# Patient Record
Sex: FEMALE | Race: WHITE | NOT HISPANIC OR LATINO
[De-identification: names, ages, dates, MRNs, and addresses within clinical notes are randomized per-mention and may not be internally consistent; named-entity substitution may affect disease eponyms.]

---

## 2017-06-01 PROBLEM — Z00.00 ENCOUNTER FOR PREVENTIVE HEALTH EXAMINATION: Status: ACTIVE | Noted: 2017-06-01

## 2021-01-14 ENCOUNTER — TRANSCRIPTION ENCOUNTER (OUTPATIENT)
Age: 81
End: 2021-01-14

## 2023-08-25 ENCOUNTER — INPATIENT (INPATIENT)
Facility: HOSPITAL | Age: 83
LOS: 2 days | Discharge: HOME HEALTH | DRG: 312 | End: 2023-08-28
Attending: INTERNAL MEDICINE | Admitting: INTERNAL MEDICINE
Payer: MEDICARE

## 2023-08-25 VITALS
OXYGEN SATURATION: 97 % | HEIGHT: 67 IN | SYSTOLIC BLOOD PRESSURE: 109 MMHG | RESPIRATION RATE: 17 BRPM | TEMPERATURE: 98 F | HEART RATE: 74 BPM | WEIGHT: 154.98 LBS | DIASTOLIC BLOOD PRESSURE: 73 MMHG

## 2023-08-25 DIAGNOSIS — R55 SYNCOPE AND COLLAPSE: ICD-10-CM

## 2023-08-25 LAB
ALBUMIN SERPL ELPH-MCNC: 3.8 G/DL — SIGNIFICANT CHANGE UP (ref 3.3–5)
ALBUMIN SERPL ELPH-MCNC: 3.8 G/DL — SIGNIFICANT CHANGE UP (ref 3.3–5)
ALP SERPL-CCNC: 82 U/L — SIGNIFICANT CHANGE UP (ref 40–120)
ALP SERPL-CCNC: 83 U/L — SIGNIFICANT CHANGE UP (ref 40–120)
ALT FLD-CCNC: 16 U/L — SIGNIFICANT CHANGE UP (ref 10–45)
ALT FLD-CCNC: 17 U/L — SIGNIFICANT CHANGE UP (ref 10–45)
ANION GAP SERPL CALC-SCNC: 10 MMOL/L — SIGNIFICANT CHANGE UP (ref 5–17)
ANION GAP SERPL CALC-SCNC: 12 MMOL/L — SIGNIFICANT CHANGE UP (ref 5–17)
APPEARANCE UR: ABNORMAL
APTT BLD: 22.2 SEC — LOW (ref 24.5–35.6)
AST SERPL-CCNC: 18 U/L — SIGNIFICANT CHANGE UP (ref 10–40)
AST SERPL-CCNC: 24 U/L — SIGNIFICANT CHANGE UP (ref 10–40)
BACTERIA # UR AUTO: ABNORMAL
BASOPHILS # BLD AUTO: 0.08 K/UL — SIGNIFICANT CHANGE UP (ref 0–0.2)
BASOPHILS NFR BLD AUTO: 1.1 % — SIGNIFICANT CHANGE UP (ref 0–2)
BILIRUB SERPL-MCNC: 0.5 MG/DL — SIGNIFICANT CHANGE UP (ref 0.2–1.2)
BILIRUB SERPL-MCNC: 0.5 MG/DL — SIGNIFICANT CHANGE UP (ref 0.2–1.2)
BILIRUB UR-MCNC: NEGATIVE — SIGNIFICANT CHANGE UP
BUN SERPL-MCNC: 24 MG/DL — HIGH (ref 7–23)
BUN SERPL-MCNC: 24 MG/DL — HIGH (ref 7–23)
CALCIUM SERPL-MCNC: 9.2 MG/DL — SIGNIFICANT CHANGE UP (ref 8.4–10.5)
CALCIUM SERPL-MCNC: 9.2 MG/DL — SIGNIFICANT CHANGE UP (ref 8.4–10.5)
CHLORIDE SERPL-SCNC: 103 MMOL/L — SIGNIFICANT CHANGE UP (ref 96–108)
CHLORIDE SERPL-SCNC: 103 MMOL/L — SIGNIFICANT CHANGE UP (ref 96–108)
CO2 SERPL-SCNC: 20 MMOL/L — LOW (ref 22–31)
CO2 SERPL-SCNC: 22 MMOL/L — SIGNIFICANT CHANGE UP (ref 22–31)
COLOR SPEC: ABNORMAL
CREAT SERPL-MCNC: 1.08 MG/DL — SIGNIFICANT CHANGE UP (ref 0.5–1.3)
CREAT SERPL-MCNC: 1.14 MG/DL — SIGNIFICANT CHANGE UP (ref 0.5–1.3)
DIFF PNL FLD: ABNORMAL
EGFR: 48 ML/MIN/1.73M2 — LOW
EGFR: 51 ML/MIN/1.73M2 — LOW
EOSINOPHIL # BLD AUTO: 0.09 K/UL — SIGNIFICANT CHANGE UP (ref 0–0.5)
EOSINOPHIL NFR BLD AUTO: 1.2 % — SIGNIFICANT CHANGE UP (ref 0–6)
EPI CELLS # UR: 12 /HPF — HIGH
GLUCOSE SERPL-MCNC: 111 MG/DL — HIGH (ref 70–99)
GLUCOSE SERPL-MCNC: 116 MG/DL — HIGH (ref 70–99)
GLUCOSE UR QL: NEGATIVE — SIGNIFICANT CHANGE UP
HCT VFR BLD CALC: 37 % — SIGNIFICANT CHANGE UP (ref 34.5–45)
HGB BLD-MCNC: 11.2 G/DL — LOW (ref 11.5–15.5)
HYALINE CASTS # UR AUTO: 5 /LPF — HIGH (ref 0–2)
IMM GRANULOCYTES NFR BLD AUTO: 0.4 % — SIGNIFICANT CHANGE UP (ref 0–0.9)
INR BLD: 0.99 RATIO — SIGNIFICANT CHANGE UP (ref 0.85–1.18)
KETONES UR-MCNC: NEGATIVE — SIGNIFICANT CHANGE UP
LEUKOCYTE ESTERASE UR-ACNC: ABNORMAL
LYMPHOCYTES # BLD AUTO: 1.19 K/UL — SIGNIFICANT CHANGE UP (ref 1–3.3)
LYMPHOCYTES # BLD AUTO: 16.5 % — SIGNIFICANT CHANGE UP (ref 13–44)
MAGNESIUM SERPL-MCNC: 2.5 MG/DL — SIGNIFICANT CHANGE UP (ref 1.6–2.6)
MCHC RBC-ENTMCNC: 25 PG — LOW (ref 27–34)
MCHC RBC-ENTMCNC: 30.3 GM/DL — LOW (ref 32–36)
MCV RBC AUTO: 82.6 FL — SIGNIFICANT CHANGE UP (ref 80–100)
MONOCYTES # BLD AUTO: 0.51 K/UL — SIGNIFICANT CHANGE UP (ref 0–0.9)
MONOCYTES NFR BLD AUTO: 7.1 % — SIGNIFICANT CHANGE UP (ref 2–14)
NEUTROPHILS # BLD AUTO: 5.31 K/UL — SIGNIFICANT CHANGE UP (ref 1.8–7.4)
NEUTROPHILS NFR BLD AUTO: 73.7 % — SIGNIFICANT CHANGE UP (ref 43–77)
NITRITE UR-MCNC: POSITIVE
NRBC # BLD: 0 /100 WBCS — SIGNIFICANT CHANGE UP (ref 0–0)
PH UR: 6 — SIGNIFICANT CHANGE UP (ref 5–8)
PLATELET # BLD AUTO: 266 K/UL — SIGNIFICANT CHANGE UP (ref 150–400)
POTASSIUM SERPL-MCNC: 5.2 MMOL/L — SIGNIFICANT CHANGE UP (ref 3.5–5.3)
POTASSIUM SERPL-MCNC: 5.5 MMOL/L — HIGH (ref 3.5–5.3)
POTASSIUM SERPL-SCNC: 5.2 MMOL/L — SIGNIFICANT CHANGE UP (ref 3.5–5.3)
POTASSIUM SERPL-SCNC: 5.5 MMOL/L — HIGH (ref 3.5–5.3)
PROT SERPL-MCNC: 6.9 G/DL — SIGNIFICANT CHANGE UP (ref 6–8.3)
PROT SERPL-MCNC: 7.2 G/DL — SIGNIFICANT CHANGE UP (ref 6–8.3)
PROT UR-MCNC: ABNORMAL
PROTHROM AB SERPL-ACNC: 10.4 SEC — SIGNIFICANT CHANGE UP (ref 9.5–13)
RBC # BLD: 4.48 M/UL — SIGNIFICANT CHANGE UP (ref 3.8–5.2)
RBC # FLD: 14.6 % — HIGH (ref 10.3–14.5)
RBC CASTS # UR COMP ASSIST: 3 /HPF — SIGNIFICANT CHANGE UP (ref 0–4)
SODIUM SERPL-SCNC: 135 MMOL/L — SIGNIFICANT CHANGE UP (ref 135–145)
SODIUM SERPL-SCNC: 135 MMOL/L — SIGNIFICANT CHANGE UP (ref 135–145)
SP GR SPEC: 1.02 — SIGNIFICANT CHANGE UP (ref 1.01–1.02)
TROPONIN T, HIGH SENSITIVITY RESULT: 10 NG/L — SIGNIFICANT CHANGE UP (ref 0–51)
TROPONIN T, HIGH SENSITIVITY RESULT: 11 NG/L — SIGNIFICANT CHANGE UP (ref 0–51)
UROBILINOGEN FLD QL: NEGATIVE — SIGNIFICANT CHANGE UP
WBC # BLD: 7.21 K/UL — SIGNIFICANT CHANGE UP (ref 3.8–10.5)
WBC # FLD AUTO: 7.21 K/UL — SIGNIFICANT CHANGE UP (ref 3.8–10.5)
WBC UR QL: 96 /HPF — HIGH (ref 0–5)

## 2023-08-25 PROCEDURE — 71045 X-RAY EXAM CHEST 1 VIEW: CPT | Mod: 26

## 2023-08-25 PROCEDURE — 99285 EMERGENCY DEPT VISIT HI MDM: CPT | Mod: GC

## 2023-08-25 PROCEDURE — 70450 CT HEAD/BRAIN W/O DYE: CPT | Mod: 26,MA

## 2023-08-25 RX ORDER — SENNA PLUS 8.6 MG/1
1 TABLET ORAL
Refills: 0 | DISCHARGE

## 2023-08-25 RX ORDER — PANTOPRAZOLE SODIUM 20 MG/1
40 TABLET, DELAYED RELEASE ORAL
Refills: 0 | Status: DISCONTINUED | OUTPATIENT
Start: 2023-08-25 | End: 2023-08-28

## 2023-08-25 RX ORDER — LEVOTHYROXINE SODIUM 125 MCG
1 TABLET ORAL
Refills: 0 | DISCHARGE

## 2023-08-25 RX ORDER — LISINOPRIL 2.5 MG/1
5 TABLET ORAL DAILY
Refills: 0 | Status: DISCONTINUED | OUTPATIENT
Start: 2023-08-25 | End: 2023-08-27

## 2023-08-25 RX ORDER — FOLIC ACID 0.8 MG
1 TABLET ORAL DAILY
Refills: 0 | Status: DISCONTINUED | OUTPATIENT
Start: 2023-08-25 | End: 2023-08-28

## 2023-08-25 RX ORDER — SENNA PLUS 8.6 MG/1
2 TABLET ORAL AT BEDTIME
Refills: 0 | Status: DISCONTINUED | OUTPATIENT
Start: 2023-08-25 | End: 2023-08-28

## 2023-08-25 RX ORDER — POLYETHYLENE GLYCOL 3350 17 G/17G
17 POWDER, FOR SOLUTION ORAL
Refills: 0 | DISCHARGE

## 2023-08-25 RX ORDER — ATORVASTATIN CALCIUM 80 MG/1
20 TABLET, FILM COATED ORAL AT BEDTIME
Refills: 0 | Status: DISCONTINUED | OUTPATIENT
Start: 2023-08-25 | End: 2023-08-28

## 2023-08-25 RX ORDER — LEVOTHYROXINE SODIUM 125 MCG
100 TABLET ORAL DAILY
Refills: 0 | Status: DISCONTINUED | OUTPATIENT
Start: 2023-08-25 | End: 2023-08-28

## 2023-08-25 RX ORDER — ROSUVASTATIN CALCIUM 5 MG/1
1 TABLET ORAL
Refills: 0 | DISCHARGE

## 2023-08-25 RX ORDER — POLYETHYLENE GLYCOL 3350 17 G/17G
17 POWDER, FOR SOLUTION ORAL DAILY
Refills: 0 | Status: DISCONTINUED | OUTPATIENT
Start: 2023-08-25 | End: 2023-08-28

## 2023-08-25 RX ORDER — CEFTRIAXONE 500 MG/1
1000 INJECTION, POWDER, FOR SOLUTION INTRAMUSCULAR; INTRAVENOUS ONCE
Refills: 0 | Status: COMPLETED | OUTPATIENT
Start: 2023-08-25 | End: 2023-08-25

## 2023-08-25 RX ORDER — FOLIC ACID 0.8 MG
1 TABLET ORAL
Refills: 0 | DISCHARGE

## 2023-08-25 RX ORDER — OMEPRAZOLE 10 MG/1
2 CAPSULE, DELAYED RELEASE ORAL
Refills: 0 | DISCHARGE

## 2023-08-25 RX ORDER — CEFTRIAXONE 500 MG/1
1000 INJECTION, POWDER, FOR SOLUTION INTRAMUSCULAR; INTRAVENOUS EVERY 24 HOURS
Refills: 0 | Status: COMPLETED | OUTPATIENT
Start: 2023-08-26 | End: 2023-08-27

## 2023-08-25 RX ORDER — GALANTAMINE HYDROBROMIDE 4 MG/1
1 TABLET, FILM COATED ORAL
Refills: 0 | DISCHARGE

## 2023-08-25 RX ADMIN — CEFTRIAXONE 100 MILLIGRAM(S): 500 INJECTION, POWDER, FOR SOLUTION INTRAMUSCULAR; INTRAVENOUS at 15:59

## 2023-08-25 RX ADMIN — ATORVASTATIN CALCIUM 20 MILLIGRAM(S): 80 TABLET, FILM COATED ORAL at 21:02

## 2023-08-25 RX ADMIN — SENNA PLUS 2 TABLET(S): 8.6 TABLET ORAL at 21:02

## 2023-08-25 RX ADMIN — LISINOPRIL 5 MILLIGRAM(S): 2.5 TABLET ORAL at 18:32

## 2023-08-25 NOTE — H&P ADULT - HISTORY OF PRESENT ILLNESS
83-year-old female with PMH mds, cad s/p stenting presenting after 3 episodes of syncope that occurred today..  Patient was brought in by EMS. Aide at bedside states that pt has had diarrhea for the last 24h. Was having an episode and lost consiousness on the toilet. Pt also endorsing dysuria. Deneis fever cp sob abd pain.

## 2023-08-25 NOTE — ED PROVIDER NOTE - ATTENDING CONTRIBUTION TO CARE
Attending MD Gruber:  I personally have seen and examined this patient. I have performed a substantive portion of the visit including all aspects of the medical decision making.  Resident note reviewed and agree on plan of care and except where noted.      83-year-old woman presenting from doctor's office with her aide for syncopal episode x3.  Patient reportedly was having a loose bowel movement and then lost consciousness.  Aide states that the patient was shaking all over and that this lasted a couple minutes.  Patient's aide states that this happens frequently in the past however the diagnosis is uncertain.  The patient herself states she must of fainted but she is not sure what happened prior to this.  She is asking to be discharged immediately.  She feels well at this time.  The patient is a limited and evasive historian otherwise.    Patient's vital signs nonactionable.  She is sitting in stretcher no apparent distress.  Tongue with no lacerations or abrasions.  Head is atraumatic.  Moves all extremities spontaneously.  Clear lungs with regular heart sounds no obvious murmur.  Abdomen nontender.  Peripheral pulses intact.    ECG at 1142 independently interpreted by me and shows normal sinus rhythm left axis deviation no diagnostic ischemic findings normal intervals.    Patient presenting for evaluation of syncopal episode today.  There was some shaking activity so would consider seizure activity as well however less likely as this is happened to the patient frequently and there does not appear to be any confusional episodes thereafter.  Plan for EKG lab work CT head maintain on telemetry and reassess          *The above represents an initial assessment/impression. Please refer to progress notes for potential changes in patient clinical course*

## 2023-08-25 NOTE — ED PROVIDER NOTE - IV ALTEPLASE INCLUSION HIDDEN
Results for orders placed or performed in visit on 01/13/20   Cardiac EP device report    Narrative    MDT DUAL CHAMBER ICD  CARELINK TRANSMISSION: BATTERY STATUS "OK"  AP 2%  0%  ALL AVAILABLE LEAD PARAMETERS WITHIN NORMAL LIMITS  NO SIGNIFICANT HIGH RATE EPISODES  NORMAL DEVICE FUNCTION   NC
show

## 2023-08-25 NOTE — ED PROVIDER NOTE - PHYSICAL EXAMINATION
PHYSICAL EXAM:  GEN: NAD awake alert   HEAD: Atraumatic  EYES: Clear bilaterally, pupils equal, round and reactive to light.  CARDIAC: Normal rate, regular rhythm. +S1/S2. No murmurs, rubs or gallops.  RESPIRATORY: Breathing unlabored. Breath sounds clear and equal bilaterally.  ABDOMEN:  Soft, nontender, nondistended.   NEUROLOGICAL: Alert and oriented, no focal deficits, no motor or sensory deficits. CN2-12 intact. Sensation intact x4 extremities.  SKIN: Skin warm and dry. No evidence of rashes or lesions.

## 2023-08-25 NOTE — PATIENT PROFILE ADULT - FALL HARM RISK - HARM RISK INTERVENTIONS
Assistance with ambulation/Assistance OOB with selected safe patient handling equipment/Communicate Risk of Fall with Harm to all staff/Monitor for mental status changes/Monitor gait and stability/Reinforce activity limits and safety measures with patient and family/Reorient to person, place and time as needed/Review medications for side effects contributing to fall risk/Sit up slowly, dangle for a short time, stand at bedside before walking/Tailored Fall Risk Interventions/Toileting schedule using arm’s reach rule for commode and bathroom/Use of alarms - bed, chair and/or voice tab/Visual Cue: Yellow wristband and red socks/Bed in lowest position, wheels locked, appropriate side rails in place/Call bell, personal items and telephone in reach/Instruct patient to call for assistance before getting out of bed or chair/Non-slip footwear when patient is out of bed/[place] to call system/Physically safe environment - no spills, clutter or unnecessary equipment/Purposeful Proactive Rounding/Room/bathroom lighting operational, light cord in reach

## 2023-08-25 NOTE — ED PROVIDER NOTE - CLINICAL SUMMARY MEDICAL DECISION MAKING FREE TEXT BOX
83F with pmh mds and cad s/p stenting presenting with syncope, x 3today. atraumatic. VS not actionable. Exam with normal neurologic exam, normal heart/lung sounds, nontender abdomen. Plan to obtain ekg cxr cth cbc cmp trop , reassess

## 2023-08-25 NOTE — ED PROVIDER NOTE - OBJECTIVE STATEMENT
83-year-old female presenting after 3 episodes of syncope that occurred today.  Patient was brought in by EMS 83-year-old female presenting after 3 episodes of syncope that occurred today. PMH mds, cad s/p stenting.  Patient was brought in by EMS. Aide at bedside states that pt has had diarrhea for the last 24h. Was having an episode and lost consiousness on the toilet. Pt also endorsing dysuria. Deneis fever cp sob abd pain.

## 2023-08-25 NOTE — ED ADULT NURSE NOTE - OBJECTIVE STATEMENT
84 y/o female BIBA s/p syncope. A&Ox2 (person and place). PMH dementia, HLD, anemia with hx of Blood transfusions and monthly iron infusions via a right chest port. Patient presenting with her home aid that lives with her 24-7. Patient was at her urologist due to several days of frequent urination. At the urologist her aid was wheeling her to the bathroom when she had a witnessed syncopal episode in the 82 y/o female BIBA s/p syncope. A&Ox2 (person and place). PMH dementia, HLD, anemia with hx of Blood transfusions and monthly iron infusions via a right chest port. Patient presenting with her home aid that lives with her 24-7. Patient was at her urologist due to several days of frequent urination. At the urologist her aid was wheeling her to the bathroom when she had a witnessed syncopal episode in the wheelchair, no loss of bowl or bladder during this episode no seizure like activity. Patient endorses "feeling weird" prior to the syncopal episode.

## 2023-08-26 DIAGNOSIS — F03.A0 UNSPECIFIED DEMENTIA, MILD, WITHOUT BEHAVIORAL DISTURBANCE, PSYCHOTIC DISTURBANCE, MOOD DISTURBANCE, AND ANXIETY: ICD-10-CM

## 2023-08-26 DIAGNOSIS — D46.9 MYELODYSPLASTIC SYNDROME, UNSPECIFIED: ICD-10-CM

## 2023-08-26 DIAGNOSIS — N39.0 URINARY TRACT INFECTION, SITE NOT SPECIFIED: ICD-10-CM

## 2023-08-26 DIAGNOSIS — E03.9 HYPOTHYROIDISM, UNSPECIFIED: ICD-10-CM

## 2023-08-26 DIAGNOSIS — I10 ESSENTIAL (PRIMARY) HYPERTENSION: ICD-10-CM

## 2023-08-26 DIAGNOSIS — R55 SYNCOPE AND COLLAPSE: ICD-10-CM

## 2023-08-26 DIAGNOSIS — I25.10 ATHEROSCLEROTIC HEART DISEASE OF NATIVE CORONARY ARTERY WITHOUT ANGINA PECTORIS: ICD-10-CM

## 2023-08-26 LAB
ANION GAP SERPL CALC-SCNC: 12 MMOL/L — SIGNIFICANT CHANGE UP (ref 5–17)
BUN SERPL-MCNC: 20 MG/DL — SIGNIFICANT CHANGE UP (ref 7–23)
CALCIUM SERPL-MCNC: 9.2 MG/DL — SIGNIFICANT CHANGE UP (ref 8.4–10.5)
CHLORIDE SERPL-SCNC: 103 MMOL/L — SIGNIFICANT CHANGE UP (ref 96–108)
CO2 SERPL-SCNC: 22 MMOL/L — SIGNIFICANT CHANGE UP (ref 22–31)
CREAT SERPL-MCNC: 1.04 MG/DL — SIGNIFICANT CHANGE UP (ref 0.5–1.3)
EGFR: 53 ML/MIN/1.73M2 — LOW
GLUCOSE SERPL-MCNC: 104 MG/DL — HIGH (ref 70–99)
HCT VFR BLD CALC: 34.9 % — SIGNIFICANT CHANGE UP (ref 34.5–45)
HGB BLD-MCNC: 10.7 G/DL — LOW (ref 11.5–15.5)
MAGNESIUM SERPL-MCNC: 2.5 MG/DL — SIGNIFICANT CHANGE UP (ref 1.6–2.6)
MCHC RBC-ENTMCNC: 25 PG — LOW (ref 27–34)
MCHC RBC-ENTMCNC: 30.7 GM/DL — LOW (ref 32–36)
MCV RBC AUTO: 81.5 FL — SIGNIFICANT CHANGE UP (ref 80–100)
NRBC # BLD: 0 /100 WBCS — SIGNIFICANT CHANGE UP (ref 0–0)
PHOSPHATE SERPL-MCNC: 3.9 MG/DL — SIGNIFICANT CHANGE UP (ref 2.5–4.5)
PLATELET # BLD AUTO: 270 K/UL — SIGNIFICANT CHANGE UP (ref 150–400)
POTASSIUM SERPL-MCNC: 4.8 MMOL/L — SIGNIFICANT CHANGE UP (ref 3.5–5.3)
POTASSIUM SERPL-SCNC: 4.8 MMOL/L — SIGNIFICANT CHANGE UP (ref 3.5–5.3)
RBC # BLD: 4.28 M/UL — SIGNIFICANT CHANGE UP (ref 3.8–5.2)
RBC # FLD: 14.5 % — SIGNIFICANT CHANGE UP (ref 10.3–14.5)
SODIUM SERPL-SCNC: 137 MMOL/L — SIGNIFICANT CHANGE UP (ref 135–145)
WBC # BLD: 4.71 K/UL — SIGNIFICANT CHANGE UP (ref 3.8–10.5)
WBC # FLD AUTO: 4.71 K/UL — SIGNIFICANT CHANGE UP (ref 3.8–10.5)

## 2023-08-26 RX ORDER — LANOLIN ALCOHOL/MO/W.PET/CERES
5 CREAM (GRAM) TOPICAL ONCE
Refills: 0 | Status: COMPLETED | OUTPATIENT
Start: 2023-08-26 | End: 2023-08-26

## 2023-08-26 RX ORDER — ENOXAPARIN SODIUM 100 MG/ML
40 INJECTION SUBCUTANEOUS EVERY 24 HOURS
Refills: 0 | Status: DISCONTINUED | OUTPATIENT
Start: 2023-08-26 | End: 2023-08-28

## 2023-08-26 RX ORDER — ASPIRIN/CALCIUM CARB/MAGNESIUM 324 MG
81 TABLET ORAL DAILY
Refills: 0 | Status: DISCONTINUED | OUTPATIENT
Start: 2023-08-26 | End: 2023-08-28

## 2023-08-26 RX ORDER — LANOLIN ALCOHOL/MO/W.PET/CERES
3 CREAM (GRAM) TOPICAL ONCE
Refills: 0 | Status: COMPLETED | OUTPATIENT
Start: 2023-08-26 | End: 2023-08-26

## 2023-08-26 RX ADMIN — Medication 5 MILLIGRAM(S): at 00:11

## 2023-08-26 RX ADMIN — ENOXAPARIN SODIUM 40 MILLIGRAM(S): 100 INJECTION SUBCUTANEOUS at 00:36

## 2023-08-26 RX ADMIN — ATORVASTATIN CALCIUM 20 MILLIGRAM(S): 80 TABLET, FILM COATED ORAL at 22:15

## 2023-08-26 RX ADMIN — LISINOPRIL 5 MILLIGRAM(S): 2.5 TABLET ORAL at 17:22

## 2023-08-26 RX ADMIN — Medication 3 MILLIGRAM(S): at 23:28

## 2023-08-26 RX ADMIN — SENNA PLUS 2 TABLET(S): 8.6 TABLET ORAL at 22:15

## 2023-08-26 RX ADMIN — POLYETHYLENE GLYCOL 3350 17 GRAM(S): 17 POWDER, FOR SOLUTION ORAL at 12:31

## 2023-08-26 RX ADMIN — Medication 100 MICROGRAM(S): at 07:56

## 2023-08-26 RX ADMIN — Medication 81 MILLIGRAM(S): at 22:15

## 2023-08-26 RX ADMIN — ENOXAPARIN SODIUM 40 MILLIGRAM(S): 100 INJECTION SUBCUTANEOUS at 23:27

## 2023-08-26 RX ADMIN — PANTOPRAZOLE SODIUM 40 MILLIGRAM(S): 20 TABLET, DELAYED RELEASE ORAL at 07:57

## 2023-08-26 RX ADMIN — Medication 1 MILLIGRAM(S): at 12:31

## 2023-08-26 RX ADMIN — CEFTRIAXONE 100 MILLIGRAM(S): 500 INJECTION, POWDER, FOR SOLUTION INTRAMUSCULAR; INTRAVENOUS at 15:39

## 2023-08-26 NOTE — PHYSICAL THERAPY INITIAL EVALUATION ADULT - ACTIVE RANGE OF MOTION EXAMINATION, REHAB EVAL
+ kyphosis, forward head psoture/bilateral upper extremity Active ROM was WFL (within functional limits)/bilateral  lower extremity Active ROM was WFL (within functional limits)

## 2023-08-26 NOTE — PHYSICAL THERAPY INITIAL EVALUATION ADULT - IMPAIRED TRANSFERS: SIT/STAND, REHAB EVAL
impaired balance/decreased flexibility/impaired postural control/impaired sensory feedback/decreased strength

## 2023-08-26 NOTE — PROGRESS NOTE ADULT - SUBJECTIVE AND OBJECTIVE BOX
Date of Service  : 08-26-23     INTERVAL HPI/OVERNIGHT EVENTS: Seen and examined with HHA in room. Feeling better.   Vital Signs Last 24 Hrs  T(C): 36.5 (26 Aug 2023 12:24), Max: 37.1 (25 Aug 2023 20:52)  T(F): 97.7 (26 Aug 2023 12:24), Max: 98.7 (25 Aug 2023 20:52)  HR: 66 (26 Aug 2023 12:24) (66 - 76)  BP: 95/61 (26 Aug 2023 12:24) (95/61 - 183/85)  BP(mean): 96 (25 Aug 2023 18:59) (94 - 96)  RR: 18 (26 Aug 2023 12:24) (18 - 18)  SpO2: 96% (26 Aug 2023 12:24) (96% - 100%)    Parameters below as of 26 Aug 2023 12:24  Patient On (Oxygen Delivery Method): room air      I&O's Summary    MEDICATIONS  (STANDING):  atorvastatin 20 milliGRAM(s) Oral at bedtime  cefTRIAXone   IVPB 1000 milliGRAM(s) IV Intermittent every 24 hours  enoxaparin Injectable 40 milliGRAM(s) SubCutaneous every 24 hours  folic acid 1 milliGRAM(s) Oral daily  levothyroxine 100 MICROGram(s) Oral daily  lisinopril 5 milliGRAM(s) Oral daily  pantoprazole    Tablet 40 milliGRAM(s) Oral before breakfast  polyethylene glycol 3350 17 Gram(s) Oral daily  senna 2 Tablet(s) Oral at bedtime    MEDICATIONS  (PRN):    LABS:                        10.7   4.71  )-----------( 270      ( 26 Aug 2023 10:41 )             34.9     08-26    137  |  103  |  20  ----------------------------<  104<H>  4.8   |  22  |  1.04    Ca    9.2      26 Aug 2023 10:41  Phos  3.9     08-26  Mg     2.5     08-26    TPro  6.9  /  Alb  3.8  /  TBili  0.5  /  DBili  x   /  AST  18  /  ALT  16  /  AlkPhos  82  08-25    PT/INR - ( 25 Aug 2023 11:54 )   PT: 10.4 sec;   INR: 0.99 ratio         PTT - ( 25 Aug 2023 11:54 )  PTT:22.2 sec  Urinalysis Basic - ( 26 Aug 2023 10:41 )    Color: x / Appearance: x / SG: x / pH: x  Gluc: 104 mg/dL / Ketone: x  / Bili: x / Urobili: x   Blood: x / Protein: x / Nitrite: x   Leuk Esterase: x / RBC: x / WBC x   Sq Epi: x / Non Sq Epi: x / Bacteria: x      CAPILLARY BLOOD GLUCOSE            Urinalysis Basic - ( 26 Aug 2023 10:41 )    Color: x / Appearance: x / SG: x / pH: x  Gluc: 104 mg/dL / Ketone: x  / Bili: x / Urobili: x   Blood: x / Protein: x / Nitrite: x   Leuk Esterase: x / RBC: x / WBC x   Sq Epi: x / Non Sq Epi: x / Bacteria: x      REVIEW OF SYSTEMS:  CONSTITUTIONAL: No fever, weight loss, or fatigue  EYES: No eye pain, visual disturbances, or discharge  ENMT:  No difficulty hearing, tinnitus, vertigo; No sinus or throat pain  NECK: No pain or stiffness  RESPIRATORY: No cough, wheezing, chills or hemoptysis; No shortness of breath  CARDIOVASCULAR: No chest pain, palpitations, dizziness, or leg swelling  GASTROINTESTINAL: No abdominal or epigastric pain. No nausea, vomiting, or hematemesis; No diarrhea or constipation. No melena or hematochezia.  GENITOURINARY: No dysuria, frequency, hematuria, or incontinence      RADIOLOGY & ADDITIONAL TESTS:    Consultant(s) Notes Reviewed:  [x ] YES  [ ] NO    PHYSICAL EXAM:  GENERAL: NAD, well-groomed, well-developed,not in any distress ,  HEAD:  Atraumatic, Normocephalic  EYES: EOMI, PERRLA, conjunctiva and sclera clear  ENMT: No tonsillar erythema, exudates, or enlargement; Moist mucous membranes, Good dentition, No lesions  NECK: Supple, No JVD, Normal thyroid  NERVOUS SYSTEM:  Alert &, No focal deficit   CHEST/LUNG: Good air entry bilateral with no  rales, rhonchi, wheezing, or rubs  HEART: Regular rate and rhythm; No murmurs, rubs, or gallops  ABDOMEN: Soft, Nontender, Nondistended; Bowel sounds present  EXTREMITIES:  2+ Peripheral Pulses, No clubbing, cyanosis, or edema  SKIN: No rashes or lesions    Care Discussed with Consultants/Other Providers [ x] YES  [ ] NO

## 2023-08-26 NOTE — PHYSICAL THERAPY INITIAL EVALUATION ADULT - BALANCE TRAINING, PT EVAL
Goal: Pt will improve balance one half grade to improve performance and safety of transfers and ambulation in 3 weeks.

## 2023-08-26 NOTE — PHYSICAL THERAPY INITIAL EVALUATION ADULT - ADDITIONAL COMMENTS
Pt lives in  a private house with + ramp and pt resides on main floor. Pt has 24 hr x 7 day HHA. Pt has hospital bed, wheelchair and RW.

## 2023-08-26 NOTE — CONSULT NOTE ADULT - SUBJECTIVE AND OBJECTIVE BOX
Cardiovascular Disease Initial Evaluation  Date of service: 08-26-23 @ 08:28    CHIEF COMPLAINT:  Syncope    HISTORY OF PRESENT ILLNESS:  Ms. Riggins is an  83-year-old female with PMH mds, cad s/p PCI, HTN,  presenting after 3 episodes of syncope.  Patient was brought in by EMS. Aide at bedside states that pt has had diarrhea for the last 24h. Was having an episode and lost consiousness on the toilet. Pt also endorsing dysuria. Deneis fever cp sob abd pain.        Allergies    No Known Allergies    Intolerances    	    MEDICATIONS:  enoxaparin Injectable 40 milliGRAM(s) SubCutaneous every 24 hours  lisinopril 5 milliGRAM(s) Oral daily    cefTRIAXone   IVPB 1000 milliGRAM(s) IV Intermittent every 24 hours        pantoprazole    Tablet 40 milliGRAM(s) Oral before breakfast  polyethylene glycol 3350 17 Gram(s) Oral daily  senna 2 Tablet(s) Oral at bedtime    atorvastatin 20 milliGRAM(s) Oral at bedtime  levothyroxine 100 MICROGram(s) Oral daily    folic acid 1 milliGRAM(s) Oral daily      PAST MEDICAL & SURGICAL HISTORY:  MDS (myelodysplastic syndrome)      CAD (coronary artery disease)          FAMILY HISTORY:      SOCIAL HISTORY:    The patient is a nonsmoker       REVIEW OF SYSTEMS:  See HPI, otherwise complete 14 point review of systems negative    [x ] All others negative	  [ ] Unable to obtain    PHYSICAL EXAM:  T(C): 37.1 (08-26-23 @ 04:26), Max: 37.1 (08-25-23 @ 20:52)  HR: 66 (08-26-23 @ 04:26) (66 - 76)  BP: 105/62 (08-26-23 @ 04:26) (105/62 - 183/85)  RR: 18 (08-26-23 @ 04:26) (17 - 18)  SpO2: 96% (08-26-23 @ 04:26) (96% - 100%)  Wt(kg): --  I&O's Summary      Appearance: No Acute Distress; resting comfortably  HEENT:  Normal oral mucosa, PERRL, EOMI	  Cardiovascular: Normal S1 S2, No JVD, No murmurs/rubs/gallops  Respiratory: Normal respiratory effort; Lungs clear to auscultation bilaterally  Gastrointestinal:  Soft, Non-tender, + BS	  Skin: No rashes, No ecchymoses, No cyanosis	  Neurologic: Non-focal; no weakness  Extremities: No clubbing, cyanosis or edema  Vascular: Peripheral pulses palpable 2+ bilaterally  Psychiatry: A & O x 3, Mood & affect appropriate    Laboratory Data:	 	    CBC Full  -  ( 25 Aug 2023 11:54 )  WBC Count : 7.21 K/uL  Hemoglobin : 11.2 g/dL  Hematocrit : 37.0 %  Platelet Count - Automated : 266 K/uL  Mean Cell Volume : 82.6 fl  Mean Cell Hemoglobin : 25.0 pg  Mean Cell Hemoglobin Concentration : 30.3 gm/dL  Auto Neutrophil # : 5.31 K/uL  Auto Lymphocyte # : 1.19 K/uL  Auto Monocyte # : 0.51 K/uL  Auto Eosinophil # : 0.09 K/uL  Auto Basophil # : 0.08 K/uL  Auto Neutrophil % : 73.7 %  Auto Lymphocyte % : 16.5 %  Auto Monocyte % : 7.1 %  Auto Eosinophil % : 1.2 %  Auto Basophil % : 1.1 %    08-25    135  |  103  |  24<H>  ----------------------------<  111<H>  5.2   |  22  |  1.14  08-25    135  |  103  |  24<H>  ----------------------------<  116<H>  5.5<H>   |  20<L>  |  1.08    Ca    9.2      25 Aug 2023 13:32  Ca    9.2      25 Aug 2023 11:54  Mg     2.5     08-25    TPro  6.9  /  Alb  3.8  /  TBili  0.5  /  DBili  x   /  AST  18  /  ALT  16  /  AlkPhos  82  08-25  TPro  7.2  /  Alb  3.8  /  TBili  0.5  /  DBili  x   /  AST  24  /  ALT  17  /  AlkPhos  83  08-25      proBNP:   Lipid Profile:   HgA1c:   TSH:       CARDIAC MARKERS: Trop T 11-10            Interpretation of Telemetry: 	    ECG:  	  RADIOLOGY:  OTHER: 	    PREVIOUS DIAGNOSTIC TESTING:    [ ] Echocardiogram:  [ ] Catheterization:  [ ] Stress Test:  	    Assessment:  83-year-old female with PMH mds, cad s/p PCI, HTN,  presenting after 3 episodes of syncope    Plan of Care:    #Syncope  - Rule out cardiogenic etiology  - ACS ruled out  - EKG shows  - Echo pending  - Pt UA positive, it is possible this may be 2/2 acute infection  - Please check orthostatics    #      72 minutes spent on total encounter; more than 50% of the visit was spent counseling and/or coordinating care by the attending physician.   	  Jesus Alberto Silva DO [facility]  Cardiovascular Diseases  [phone]     Cardiovascular Disease Initial Evaluation  Date of service: 08-26-23 @ 08:28    CHIEF COMPLAINT:  Syncope    HISTORY OF PRESENT ILLNESS:  Ms. Riggins is an  83-year-old female with PMH mds, cad s/p PCI, HTN,  presenting after 3 episodes of syncope.  Patient was brought in by EMS. Aide at bedside states that pt has had diarrhea for the last 24h. Was having an episode and lost consiousness on the toilet. Pt also endorsing dysuria. Deneis fever cp sob abd pain.      Pt states that her stents were done at [place] 3-5 years ago.       Allergies    No Known Allergies    Intolerances    	    MEDICATIONS:  enoxaparin Injectable 40 milliGRAM(s) SubCutaneous every 24 hours  lisinopril 5 milliGRAM(s) Oral daily    cefTRIAXone   IVPB 1000 milliGRAM(s) IV Intermittent every 24 hours        pantoprazole    Tablet 40 milliGRAM(s) Oral before breakfast  polyethylene glycol 3350 17 Gram(s) Oral daily  senna 2 Tablet(s) Oral at bedtime    atorvastatin 20 milliGRAM(s) Oral at bedtime  levothyroxine 100 MICROGram(s) Oral daily    folic acid 1 milliGRAM(s) Oral daily      PAST MEDICAL & SURGICAL HISTORY:  MDS (myelodysplastic syndrome)      CAD (coronary artery disease)          FAMILY HISTORY:      SOCIAL HISTORY:    The patient is a nonsmoker       REVIEW OF SYSTEMS:  See HPI, otherwise complete 14 point review of systems negative    [x ] All others negative	  [ ] Unable to obtain    PHYSICAL EXAM:  T(C): 37.1 (08-26-23 @ 04:26), Max: 37.1 (08-25-23 @ 20:52)  HR: 66 (08-26-23 @ 04:26) (66 - 76)  BP: 105/62 (08-26-23 @ 04:26) (105/62 - 183/85)  RR: 18 (08-26-23 @ 04:26) (17 - 18)  SpO2: 96% (08-26-23 @ 04:26) (96% - 100%)  Wt(kg): --  I&O's Summary      Appearance: No Acute Distress; resting comfortably  HEENT:  Normal oral mucosa, PERRL, EOMI	  Cardiovascular: Normal S1 S2, No JVD, No murmurs/rubs/gallops  Respiratory: Normal respiratory effort; Lungs clear to auscultation bilaterally  Gastrointestinal:  Soft, Non-tender, + BS	  Skin: No rashes, No ecchymoses, No cyanosis	  Neurologic: Non-focal; no weakness  Extremities: No clubbing, cyanosis or edema  Vascular: Peripheral pulses palpable 2+ bilaterally  Psychiatry: A & O x 3, Mood & affect appropriate    Laboratory Data:	 	    CBC Full  -  ( 25 Aug 2023 11:54 )  WBC Count : 7.21 K/uL  Hemoglobin : 11.2 g/dL  Hematocrit : 37.0 %  Platelet Count - Automated : 266 K/uL  Mean Cell Volume : 82.6 fl  Mean Cell Hemoglobin : 25.0 pg  Mean Cell Hemoglobin Concentration : 30.3 gm/dL  Auto Neutrophil # : 5.31 K/uL  Auto Lymphocyte # : 1.19 K/uL  Auto Monocyte # : 0.51 K/uL  Auto Eosinophil # : 0.09 K/uL  Auto Basophil # : 0.08 K/uL  Auto Neutrophil % : 73.7 %  Auto Lymphocyte % : 16.5 %  Auto Monocyte % : 7.1 %  Auto Eosinophil % : 1.2 %  Auto Basophil % : 1.1 %    08-25    135  |  103  |  24<H>  ----------------------------<  111<H>  5.2   |  22  |  1.14  08-25    135  |  103  |  24<H>  ----------------------------<  116<H>  5.5<H>   |  20<L>  |  1.08    Ca    9.2      25 Aug 2023 13:32  Ca    9.2      25 Aug 2023 11:54  Mg     2.5     08-25    TPro  6.9  /  Alb  3.8  /  TBili  0.5  /  DBili  x   /  AST  18  /  ALT  16  /  AlkPhos  82  08-25  TPro  7.2  /  Alb  3.8  /  TBili  0.5  /  DBili  x   /  AST  24  /  ALT  17  /  AlkPhos  83  08-25      proBNP:   Lipid Profile:   HgA1c:   TSH:       CARDIAC MARKERS: Trop T 11-10            Interpretation of Telemetry: Sinus    ECG: Sinus rhythm  RADIOLOGY:  OTHER: 	    PREVIOUS DIAGNOSTIC TESTING:    [ ] Echocardiogram:  [ ] Catheterization:  [ ] Stress Test:  	    Assessment:  83-year-old female with PMH mds, cad s/p PCI, HTN,  presenting after 3 episodes of syncope    Plan of Care:    #Syncope  - Rule out cardiogenic etiology  - ACS ruled out  - EKG shows  - Echo pending  - Pt UA positive, it is possible this may be 2/2 acute infection  - Please check orthostatics    #UTI   - Management as per primary team    #CAD  - History of stents  - Recommend ASA 81mg daily      #HTN  - Continue Lisinopril    72 minutes spent on total encounter; more than 50% of the visit was spent counseling and/or coordinating care by the attending physician.   	  Jesus Alberto Silva,  [facility]  Cardiovascular Diseases  [phone]

## 2023-08-26 NOTE — PHYSICAL THERAPY INITIAL EVALUATION ADULT - PERTINENT HX OF CURRENT PROBLEM, REHAB EVAL
84 y/o F with PMH mds, cad s/p stenting presenting after 3 episodes of syncope that occurred today. Pt was brought in by EMS. Aide at bedside states that pt has had diarrhea for the last 24h. Was having an episode and LOC on the toilet. Pt also endorsing dysuria. Deneis fever cp sob abd pain. P found to have + UA. ECG: NSR with L axis deviation. CXR: clear lungs. CTH: No acute transcortical infarct or ICH identified.  Extensive white matter small vessel changes. 82 y/o F with PMH mds, cad s/p stenting presenting after 3 episodes of syncope that occurred today. Pt was brought in by EMS. Aide at bedside states that pt has had diarrhea for the last 24h. Was having an episode and LOC on the toilet. Pt also endorsing dysuria. Deneis fever cp sob abd pain. Pt found to have + UA. ECG: NSR with L axis deviation. CXR: clear lungs. CTH: No acute transcortical infarct or ICH identified.  Extensive white matter small vessel changes.

## 2023-08-27 ENCOUNTER — TRANSCRIPTION ENCOUNTER (OUTPATIENT)
Age: 83
End: 2023-08-27

## 2023-08-27 LAB
ANION GAP SERPL CALC-SCNC: 12 MMOL/L — SIGNIFICANT CHANGE UP (ref 5–17)
BUN SERPL-MCNC: 19 MG/DL — SIGNIFICANT CHANGE UP (ref 7–23)
CALCIUM SERPL-MCNC: 8.9 MG/DL — SIGNIFICANT CHANGE UP (ref 8.4–10.5)
CHLORIDE SERPL-SCNC: 103 MMOL/L — SIGNIFICANT CHANGE UP (ref 96–108)
CO2 SERPL-SCNC: 20 MMOL/L — LOW (ref 22–31)
CREAT SERPL-MCNC: 1.06 MG/DL — SIGNIFICANT CHANGE UP (ref 0.5–1.3)
CULTURE RESULTS: SIGNIFICANT CHANGE UP
EGFR: 52 ML/MIN/1.73M2 — LOW
GLUCOSE SERPL-MCNC: 117 MG/DL — HIGH (ref 70–99)
HCT VFR BLD CALC: 36.8 % — SIGNIFICANT CHANGE UP (ref 34.5–45)
HGB BLD-MCNC: 11.2 G/DL — LOW (ref 11.5–15.5)
MCHC RBC-ENTMCNC: 25 PG — LOW (ref 27–34)
MCHC RBC-ENTMCNC: 30.4 GM/DL — LOW (ref 32–36)
MCV RBC AUTO: 82.1 FL — SIGNIFICANT CHANGE UP (ref 80–100)
NRBC # BLD: 0 /100 WBCS — SIGNIFICANT CHANGE UP (ref 0–0)
PLATELET # BLD AUTO: 259 K/UL — SIGNIFICANT CHANGE UP (ref 150–400)
POTASSIUM SERPL-MCNC: 4.6 MMOL/L — SIGNIFICANT CHANGE UP (ref 3.5–5.3)
POTASSIUM SERPL-SCNC: 4.6 MMOL/L — SIGNIFICANT CHANGE UP (ref 3.5–5.3)
RBC # BLD: 4.48 M/UL — SIGNIFICANT CHANGE UP (ref 3.8–5.2)
RBC # FLD: 14.5 % — SIGNIFICANT CHANGE UP (ref 10.3–14.5)
SODIUM SERPL-SCNC: 135 MMOL/L — SIGNIFICANT CHANGE UP (ref 135–145)
SPECIMEN SOURCE: SIGNIFICANT CHANGE UP
T3 SERPL-MCNC: 72 NG/DL — LOW (ref 80–200)
T4 AB SER-ACNC: 8.3 UG/DL — SIGNIFICANT CHANGE UP (ref 4.6–12)
TSH SERPL-MCNC: 3.19 UIU/ML — SIGNIFICANT CHANGE UP (ref 0.27–4.2)
WBC # BLD: 4.63 K/UL — SIGNIFICANT CHANGE UP (ref 3.8–10.5)
WBC # FLD AUTO: 4.63 K/UL — SIGNIFICANT CHANGE UP (ref 3.8–10.5)

## 2023-08-27 RX ORDER — LANOLIN ALCOHOL/MO/W.PET/CERES
3 CREAM (GRAM) TOPICAL ONCE
Refills: 0 | Status: COMPLETED | OUTPATIENT
Start: 2023-08-27 | End: 2023-08-27

## 2023-08-27 RX ADMIN — CEFTRIAXONE 100 MILLIGRAM(S): 500 INJECTION, POWDER, FOR SOLUTION INTRAMUSCULAR; INTRAVENOUS at 15:49

## 2023-08-27 RX ADMIN — SENNA PLUS 2 TABLET(S): 8.6 TABLET ORAL at 21:31

## 2023-08-27 RX ADMIN — Medication 3 MILLIGRAM(S): at 23:00

## 2023-08-27 RX ADMIN — PANTOPRAZOLE SODIUM 40 MILLIGRAM(S): 20 TABLET, DELAYED RELEASE ORAL at 08:54

## 2023-08-27 RX ADMIN — ENOXAPARIN SODIUM 40 MILLIGRAM(S): 100 INJECTION SUBCUTANEOUS at 23:27

## 2023-08-27 RX ADMIN — ATORVASTATIN CALCIUM 20 MILLIGRAM(S): 80 TABLET, FILM COATED ORAL at 21:31

## 2023-08-27 RX ADMIN — Medication 1 MILLIGRAM(S): at 12:48

## 2023-08-27 RX ADMIN — Medication 81 MILLIGRAM(S): at 12:50

## 2023-08-27 RX ADMIN — Medication 100 MICROGRAM(S): at 07:27

## 2023-08-27 NOTE — PROVIDER CONTACT NOTE (OTHER) - ASSESSMENT
Patient is A&Ox 3. VSS. No complaints of pain, SOB, or discomfort. Patient educated by RN on reasoning for morning labs.

## 2023-08-27 NOTE — PROGRESS NOTE ADULT - SUBJECTIVE AND OBJECTIVE BOX
Date of Service  : 08-27-23     INTERVAL HPI/OVERNIGHT EVENTS: Seen and examined earlier . Very adamant in going home today.   Vital Signs Last 24 Hrs  T(C): 36.8 (27 Aug 2023 16:33), Max: 36.8 (27 Aug 2023 16:33)  T(F): 98.2 (27 Aug 2023 16:33), Max: 98.2 (27 Aug 2023 16:33)  HR: 82 (27 Aug 2023 16:33) (65 - 86)  BP: 107/69 (27 Aug 2023 16:33) (100/54 - 128/64)  BP(mean): --  RR: 18 (27 Aug 2023 16:33) (18 - 18)  SpO2: 96% (27 Aug 2023 16:33) (96% - 98%)    Parameters below as of 27 Aug 2023 16:33  Patient On (Oxygen Delivery Method): room air      I&O's Summary    26 Aug 2023 07:01  -  27 Aug 2023 07:00  --------------------------------------------------------  IN: 0 mL / OUT: 750 mL / NET: -750 mL    27 Aug 2023 07:01  -  27 Aug 2023 20:33  --------------------------------------------------------  IN: 530 mL / OUT: 1200 mL / NET: -670 mL      MEDICATIONS  (STANDING):  aspirin  chewable 81 milliGRAM(s) Oral daily  atorvastatin 20 milliGRAM(s) Oral at bedtime  enoxaparin Injectable 40 milliGRAM(s) SubCutaneous every 24 hours  folic acid 1 milliGRAM(s) Oral daily  levothyroxine 100 MICROGram(s) Oral daily  pantoprazole    Tablet 40 milliGRAM(s) Oral before breakfast  polyethylene glycol 3350 17 Gram(s) Oral daily  senna 2 Tablet(s) Oral at bedtime    MEDICATIONS  (PRN):    LABS:                        11.2   4.63  )-----------( 259      ( 27 Aug 2023 10:13 )             36.8     08-27    135  |  103  |  19  ----------------------------<  117<H>  4.6   |  20<L>  |  1.06    Ca    8.9      27 Aug 2023 10:13  Phos  3.9     08-26  Mg     2.5     08-26        Urinalysis Basic - ( 27 Aug 2023 10:13 )    Color: x / Appearance: x / SG: x / pH: x  Gluc: 117 mg/dL / Ketone: x  / Bili: x / Urobili: x   Blood: x / Protein: x / Nitrite: x   Leuk Esterase: x / RBC: x / WBC x   Sq Epi: x / Non Sq Epi: x / Bacteria: x      CAPILLARY BLOOD GLUCOSE            Urinalysis Basic - ( 27 Aug 2023 10:13 )    Color: x / Appearance: x / SG: x / pH: x  Gluc: 117 mg/dL / Ketone: x  / Bili: x / Urobili: x   Blood: x / Protein: x / Nitrite: x   Leuk Esterase: x / RBC: x / WBC x   Sq Epi: x / Non Sq Epi: x / Bacteria: x      REVIEW OF SYSTEMS:  CONSTITUTIONAL: No fever, weight loss, or fatigue  EYES: No eye pain, visual disturbances, or discharge  ENMT:  No difficulty hearing, tinnitus, vertigo; No sinus or throat pain  NECK: No pain or stiffness  RESPIRATORY: No cough, wheezing, chills or hemoptysis; No shortness of breath  CARDIOVASCULAR: No chest pain, palpitations, dizziness, or leg swelling  GASTROINTESTINAL: No abdominal or epigastric pain. No nausea, vomiting, or hematemesis; No diarrhea or constipation. No melena or hematochezia.  GENITOURINARY: No dysuria, frequency, hematuria, or incontinence  NEUROLOGICAL: No headaches,  loss of strength, numbness, or tremors      Consultant(s) Notes Reviewed:  [x ] YES  [ ] NO    PHYSICAL EXAM:  GENERAL: NAD, well-groomed, well-developed,not in any distress ,  HEAD:  Atraumatic, Normocephalic  EYES: EOMI, PERRLA, conjunctiva and sclera clear  ENMT: No tonsillar erythema, exudates, or enlargement; Moist mucous membranes, Good dentition, No lesions  NECK: Supple, No JVD, Normal thyroid  NERVOUS SYSTEM:  Alert & Oriented X2 to 3, No focal deficit   CHEST/LUNG: Good air entry bilateral with no  rales, rhonchi, wheezing, or rubs  HEART: Regular rate and rhythm; No murmurs, rubs, or gallops  ABDOMEN: Soft, Nontender, Nondistended; Bowel sounds present  EXTREMITIES:  2+ Peripheral Pulses, No clubbing, cyanosis, or edema    Care Discussed with Consultants/Other Providers [ x] YES  [ ] NO

## 2023-08-27 NOTE — PROGRESS NOTE ADULT - ASSESSMENT
83-year-old female with PMH mds, cad s/p stenting presenting after 3 episodes of syncope that occurred today..  Patient was brought in by EMS. Aide at bedside states that pt has had diarrhea for the last 24h. Was having an episode and lost consiousness on the toilet. Pt also endorsing dysuria. Deneis fever cp sob abd pain.         Problem/Plan - 1:  ·  Problem: Recurrent syncope.   ·  Plan: CT head noted.   Work up in progress. Cards to follow.     Problem/Plan - 2:  ·  Problem: Acute UTI.   ·  Plan: On Rocephin and Urine culture pending.     Problem/Plan - 3:  ·  Problem: CAD in native artery.   ·  Plan: On ASA,     Problem/Plan - 4:  ·  Problem: MDS (myelodysplastic syndrome).   ·  Plan: CBC noted. Outpt follow up.     Problem/Plan - 5:  ·  Problem: Hypothyroidism.   ·  Plan: Home dose synthroid. Will check TSH and free T4 .     Problem/Plan - 6:  ·  Problem: Benign essential HTN.   ·  Plan: BP meds with hold parameters.     Problem/Plan - 7:  ·  Problem: Mild dementia.   ·  Plan: Supportive care. Has 24hrs HHA.      
83-year-old female with PMH mds, cad s/p stenting presenting after 3 episodes of syncope that occurred today..  Patient was brought in by EMS. Aide at bedside states that pt has had diarrhea for the last 24h. Was having an episode and lost consiousness on the toilet. Pt also endorsing dysuria. Deneis fever cp sob abd pain.         Problem/Plan - 1:  ·  Problem: Recurrent syncope.   ·  Plan: CT head noted.   Work up in progress. Cards to follow.  Awaiting TTE.      Problem/Plan - 2:  ·  Problem: Acute UTI.   ·  Plan: On Rocephin and Urine culture contaminated.  Refusing any urinary symptoms  , No fever or Leucocytosis .     Problem/Plan - 3:  ·  Problem: CAD in native artery.   ·  Plan: On ASA,     Problem/Plan - 4:  ·  Problem: MDS (myelodysplastic syndrome).   ·  Plan: CBC noted. Outpt follow up.     Problem/Plan - 5:  ·  Problem: Hypothyroidism.   ·  Plan: Home dose synthroid. Will check TSH and free T4 .     Problem/Plan - 6:  ·  Problem: Benign essential HTN.   ·  Plan: BP meds with hold parameters.     Problem/Plan - 7:  ·  Problem: Mild dementia.   ·  Plan: Supportive care. Has 24hrs HHA.      Dispo : Dc planning as very  adamant to go home.

## 2023-08-28 ENCOUNTER — TRANSCRIPTION ENCOUNTER (OUTPATIENT)
Age: 83
End: 2023-08-28

## 2023-08-28 VITALS
HEART RATE: 68 BPM | OXYGEN SATURATION: 98 % | SYSTOLIC BLOOD PRESSURE: 114 MMHG | DIASTOLIC BLOOD PRESSURE: 69 MMHG | TEMPERATURE: 98 F | RESPIRATION RATE: 18 BRPM

## 2023-08-28 PROCEDURE — 36415 COLL VENOUS BLD VENIPUNCTURE: CPT

## 2023-08-28 PROCEDURE — 93005 ELECTROCARDIOGRAM TRACING: CPT

## 2023-08-28 PROCEDURE — 81001 URINALYSIS AUTO W/SCOPE: CPT

## 2023-08-28 PROCEDURE — 85610 PROTHROMBIN TIME: CPT

## 2023-08-28 PROCEDURE — 84100 ASSAY OF PHOSPHORUS: CPT

## 2023-08-28 PROCEDURE — 80048 BASIC METABOLIC PNL TOTAL CA: CPT

## 2023-08-28 PROCEDURE — 84480 ASSAY TRIIODOTHYRONINE (T3): CPT

## 2023-08-28 PROCEDURE — 87077 CULTURE AEROBIC IDENTIFY: CPT

## 2023-08-28 PROCEDURE — 71045 X-RAY EXAM CHEST 1 VIEW: CPT

## 2023-08-28 PROCEDURE — 84436 ASSAY OF TOTAL THYROXINE: CPT

## 2023-08-28 PROCEDURE — 70450 CT HEAD/BRAIN W/O DYE: CPT | Mod: MA

## 2023-08-28 PROCEDURE — 84484 ASSAY OF TROPONIN QUANT: CPT

## 2023-08-28 PROCEDURE — 85025 COMPLETE CBC W/AUTO DIFF WBC: CPT

## 2023-08-28 PROCEDURE — 87186 SC STD MICRODIL/AGAR DIL: CPT

## 2023-08-28 PROCEDURE — 97162 PT EVAL MOD COMPLEX 30 MIN: CPT

## 2023-08-28 PROCEDURE — 87086 URINE CULTURE/COLONY COUNT: CPT

## 2023-08-28 PROCEDURE — 99285 EMERGENCY DEPT VISIT HI MDM: CPT | Mod: 25

## 2023-08-28 PROCEDURE — 80053 COMPREHEN METABOLIC PANEL: CPT

## 2023-08-28 PROCEDURE — 93306 TTE W/DOPPLER COMPLETE: CPT

## 2023-08-28 PROCEDURE — 83735 ASSAY OF MAGNESIUM: CPT

## 2023-08-28 PROCEDURE — 85730 THROMBOPLASTIN TIME PARTIAL: CPT

## 2023-08-28 PROCEDURE — 93306 TTE W/DOPPLER COMPLETE: CPT | Mod: 26

## 2023-08-28 PROCEDURE — 85027 COMPLETE CBC AUTOMATED: CPT

## 2023-08-28 PROCEDURE — 84443 ASSAY THYROID STIM HORMONE: CPT

## 2023-08-28 PROCEDURE — 96374 THER/PROPH/DIAG INJ IV PUSH: CPT

## 2023-08-28 RX ORDER — SODIUM CHLORIDE 9 MG/ML
250 INJECTION INTRAMUSCULAR; INTRAVENOUS; SUBCUTANEOUS
Refills: 0 | Status: DISCONTINUED | OUTPATIENT
Start: 2023-08-28 | End: 2023-08-28

## 2023-08-28 RX ORDER — LISINOPRIL 2.5 MG/1
1 TABLET ORAL
Refills: 0 | DISCHARGE

## 2023-08-28 RX ORDER — ASPIRIN/CALCIUM CARB/MAGNESIUM 324 MG
1 TABLET ORAL
Qty: 30 | Refills: 0
Start: 2023-08-28 | End: 2023-09-26

## 2023-08-28 RX ADMIN — SODIUM CHLORIDE 250 MILLILITER(S): 9 INJECTION INTRAMUSCULAR; INTRAVENOUS; SUBCUTANEOUS at 13:25

## 2023-08-28 RX ADMIN — PANTOPRAZOLE SODIUM 40 MILLIGRAM(S): 20 TABLET, DELAYED RELEASE ORAL at 09:45

## 2023-08-28 RX ADMIN — Medication 1 MILLIGRAM(S): at 12:24

## 2023-08-28 RX ADMIN — Medication 81 MILLIGRAM(S): at 12:24

## 2023-08-28 RX ADMIN — Medication 100 MICROGRAM(S): at 06:19

## 2023-08-28 NOTE — DISCHARGE NOTE PROVIDER - HOSPITAL COURSE
Hospital Course     83-year-old female with PMH mds, cad s/p stenting presenting after 3 episodes of syncope that occurred today..  Patient was brought in by EMS. Aide at bedside states that pt has had diarrhea for the last 24h. Was having an episode and lost consiousness on the toilet. Pt also endorsing dysuria. Deneis fever cp sob abd pain.         Problem/Plan - 1:  ·  Problem: Recurrent syncope.   ·  Plan: CT head noted.   Work up in progress. Cards to follow.  Awaiting TTE.      Problem/Plan - 2:  ·  Problem: Acute UTI.   ·  Plan: On Rocephin and Urine culture contaminated.  Refusing any urinary symptoms  , No fever or Leucocytosis .     Problem/Plan - 3:  ·  Problem: CAD in native artery.   ·  Plan: On ASA,     Problem/Plan - 4:  ·  Problem: MDS (myelodysplastic syndrome).   ·  Plan: CBC noted. Outpt follow up.     Problem/Plan - 5:  ·  Problem: Hypothyroidism.   ·  Plan: Home dose synthroid. Will check TSH and free T4 .     Problem/Plan - 6:  ·  Problem: Benign essential HTN.   ·  Plan: BP meds with hold parameters.     Problem/Plan - 7:  ·  Problem: Mild dementia.   ·  Plan: Supportive care. Has 24hrs HHA.      Dispo : Home PT with 24hr HHA. Hospital Course     83-year-old female with PMH mds, cad s/p stenting presenting after 3 episodes of syncope that occurred today..  Patient was brought in by EMS. Aide at bedside states that pt has had diarrhea for the last 24h. Was having an episode and lost consiousness on the toilet. Pt also endorsing dysuria. Deneis fever cp sob abd pain.      ·  Problem: Recurrent syncope.   ·  Plan: CT head noted.   TTE>   s/p ivf bolus    ·  Problem: Acute UTI.   ·  Plan: S/P Rocephin and Urine culture contaminated.  Denies any urinary symptoms  , No fever or Leucocytosis .    Dispo : Home PT with 24hr HHA. Hospital Course     83-year-old female with PMH mds, cad s/p stenting presenting after 3 episodes of syncope that occurred today..  Patient was brought in by EMS. Aide at bedside states that pt has had diarrhea for the last 24h. Was having an episode and lost consiousness on the toilet. Pt also endorsing dysuria. Deneis fever cp sob abd pain.      ·  Problem: Recurrent syncope.   ·  Plan: CT head noted.   TTE>   s/p ivf bolus    ·  Problem: Acute UTI.   ·  Plan: S/P Rocephin and Urine culture contaminated.  Denies any urinary symptoms  , No fever or Leucocytosis .  Seen and examined with HHA in room. Very adamant in going home today . Orthostasis D/W patient in presence of HHA  including treatment and precautions in detail.   Dispo : Home PT with 24hr HHA.

## 2023-08-28 NOTE — DISCHARGE NOTE PROVIDER - NSDCCPCAREPLAN_GEN_ALL_CORE_FT
PRINCIPAL DISCHARGE DIAGNOSIS  Diagnosis: Syncope  Assessment and Plan of Treatment: trops and cth negative. s/p echo  HOME CARE INSTRUCTIONS  Have someone stay with you until you feel stable.  Do not drive, operate machinery, or play sports until your caregiver says it is okay.  Keep all follow-up appointments as directed by your caregiver.   Lie down right away if you start feeling like you might faint. Breathe deeply and steadily. Wait until all the symptoms have passed.Drink enough fluids to keep your urine clear or pale yellow.  If you are taking blood pressure or heart medicine, get up slowly, taking several minutes to sit and then stand. This can reduce dizziness.  SEEK IMMEDIATE MEDICAL CARE IF:  You have a severe headache.  You have unusual pain in the chest, abdomen, or back.  You are bleeding from the mouth or rectum, or you have black or tarry stool.  You have an irregular or very fast heartbeat.  You have pain with breathing.  You have repeated fainting or seizure-like jerking during an episode.  You faint when sitting or lying down.  You have confusion.  You have difficulty walking.  You have severe weakness.  You have vision problems.  If you fainted, call your local emergency services (_____________________). Do not drive yourself to the hospital        SECONDARY DISCHARGE DIAGNOSES  Diagnosis: Acute UTI  Assessment and Plan of Treatment: completed course of antibx ceftriaxone

## 2023-08-28 NOTE — DISCHARGE NOTE PROVIDER - NSDCMRMEDTOKEN_GEN_ALL_CORE_FT
acetaminophen 300mg / codeine 30m tab(s) orally once a day as needed  estradiol 0.1mg/gm cream: Apply 1gm vaginally once a day as needed  folic acid 1 mg oral tablet: 1 tab(s) orally once a day  galantamine 8 mg oral capsule, extended release: 1 tab(s) orally once a day  levothyroxine 100 mcg (0.1 mg) oral tablet: 1 tab(s) orally once a day  lisinopril 5 mg oral tablet: 1 tab(s) orally once a day  MiraLax oral powder for reconstitution: 17 gram(s) orally once a day  omeprazole 40 mg oral delayed release capsule: 2 cap(s) orally once a day  rosuvastatin 20 mg oral tablet: 1 tab(s) orally once a day  senna (sennosides) 8.6 mg oral tablet: 1 tab(s) orally once a day   aspirin 81 mg oral tablet, chewable: 1 tab(s) orally once a day  folic acid 1 mg oral tablet: 1 tab(s) orally once a day  galantamine 8 mg oral capsule, extended release: 1 tab(s) orally once a day  levothyroxine 100 mcg (0.1 mg) oral tablet: 1 tab(s) orally once a day  MiraLax oral powder for reconstitution: 17 gram(s) orally once a day  omeprazole 40 mg oral delayed release capsule: 2 cap(s) orally once a day  rosuvastatin 20 mg oral tablet: 1 tab(s) orally once a day  senna (sennosides) 8.6 mg oral tablet: 1 tab(s) orally once a day

## 2023-08-28 NOTE — DISCHARGE NOTE NURSING/CASE MANAGEMENT/SOCIAL WORK - PATIENT PORTAL LINK FT
You can access the FollowMyHealth Patient Portal offered by [facility] by registering at the following website: [email or web address]. By joining [email or web address]’s FollowMyHealth portal, you will also be able to view your health information using other applications (apps) compatible with our system.

## 2023-08-28 NOTE — DISCHARGE NOTE NURSING/CASE MANAGEMENT/SOCIAL WORK - NSDCPEFALRISK_GEN_ALL_CORE
For information on Fall & Injury Prevention, visit: [email or web address] OR  [email or web address] OR  https://www.cdc.gov/steadi/patient.html

## 2023-08-28 NOTE — PROGRESS NOTE ADULT - SUBJECTIVE AND OBJECTIVE BOX
Cardiovascular Disease Progress Note  Date of service: 08-28-23 @ 17:50    Overnight events: No acute events overnight.  Pt is in no distress.   Otherwise review of systems negative    Objective Findings:  T(C): 36.6 (08-28-23 @ 12:21), Max: 36.7 (08-27-23 @ 21:20)  HR: 68 (08-28-23 @ 12:21) (64 - 72)  BP: 114/69 (08-28-23 @ 12:21) (103/62 - 116/79)  RR: 18 (08-28-23 @ 12:21) (18 - 18)  SpO2: 98% (08-28-23 @ 12:21) (96% - 99%)  Wt(kg): --  Daily     Daily       Physical Exam:  Gen: NAD; Patient resting comfortably  HEENT: EOMI, Normocephalic/ atraumatic  CV: RRR, normal S1 + S2, no m/r/g  Lungs:  Normal respiratory effort; clear to auscultation bilaterally  Abd: soft, non-tender; bowel sounds present  Ext: No edema; warm and well perfused    Telemetry: Sinus     Laboratory Data:                        11.2   4.63  )-----------( 259      ( 27 Aug 2023 10:13 )             36.8     08-27    135  |  103  |  19  ----------------------------<  117<H>  4.6   |  20<L>  |  1.06    Ca    8.9      27 Aug 2023 10:13                Inpatient Medications:  MEDICATIONS  (STANDING):  aspirin  chewable 81 milliGRAM(s) Oral daily  atorvastatin 20 milliGRAM(s) Oral at bedtime  enoxaparin Injectable 40 milliGRAM(s) SubCutaneous every 24 hours  folic acid 1 milliGRAM(s) Oral daily  levothyroxine 100 MICROGram(s) Oral daily  pantoprazole    Tablet 40 milliGRAM(s) Oral before breakfast  polyethylene glycol 3350 17 Gram(s) Oral daily  senna 2 Tablet(s) Oral at bedtime  sodium chloride 0.9%. 250 milliLiter(s) (250 mL/Hr) IV Continuous <Continuous>      Assessment:  83-year-old female with PMH mds, cad s/p PCI, HTN,  presenting after 3 episodes of syncope    Plan of Care:    #Syncope  - ACS ruled out  - EKG shows sinus rhythm no acute ischemic changes   - Echo pending, if normal pt may be discharged from a cardiac perspective.   - Orthostatics positive. Pt to increase fluid intake.     #UTI   - Management as per primary team    #CAD  - History of stents  - ASA 81mg daily      #HTN  - Continue Lisinopril    #ACP (advance care planning)-  Advanced care planning was discussed.  Risks, benefits and alternatives of medical treatment and procedures were addressed. 30 additional minutes spent addressing advance care plans.      Over 25 minutes spent on total encounter; more than 50% of the visit was spent counseling and/or coordinating care by the attending physician.      Jesus Alberto Silva,  [facility]  Cardiovascular Disease  [phone] Cardiovascular Disease Progress Note  Date of service: 08-28-23 @ 17:50    Overnight events: No acute events overnight.  Pt is in no distress.   Otherwise review of systems negative    Objective Findings:  T(C): 36.6 (08-28-23 @ 12:21), Max: 36.7 (08-27-23 @ 21:20)  HR: 68 (08-28-23 @ 12:21) (64 - 72)  BP: 114/69 (08-28-23 @ 12:21) (103/62 - 116/79)  RR: 18 (08-28-23 @ 12:21) (18 - 18)  SpO2: 98% (08-28-23 @ 12:21) (96% - 99%)  Wt(kg): --  Daily     Daily       Physical Exam:  Gen: NAD; Patient resting comfortably  HEENT: EOMI, Normocephalic/ atraumatic  CV: RRR, normal S1 + S2, no m/r/g  Lungs:  Normal respiratory effort; clear to auscultation bilaterally  Abd: soft, non-tender; bowel sounds present  Ext: No edema; warm and well perfused    Telemetry: Sinus     Laboratory Data:                        11.2   4.63  )-----------( 259      ( 27 Aug 2023 10:13 )             36.8     08-27    135  |  103  |  19  ----------------------------<  117<H>  4.6   |  20<L>  |  1.06    Ca    8.9      27 Aug 2023 10:13                Inpatient Medications:  MEDICATIONS  (STANDING):  aspirin  chewable 81 milliGRAM(s) Oral daily  atorvastatin 20 milliGRAM(s) Oral at bedtime  enoxaparin Injectable 40 milliGRAM(s) SubCutaneous every 24 hours  folic acid 1 milliGRAM(s) Oral daily  levothyroxine 100 MICROGram(s) Oral daily  pantoprazole    Tablet 40 milliGRAM(s) Oral before breakfast  polyethylene glycol 3350 17 Gram(s) Oral daily  senna 2 Tablet(s) Oral at bedtime  sodium chloride 0.9%. 250 milliLiter(s) (250 mL/Hr) IV Continuous <Continuous>      Assessment:  83-year-old female with PMH mds, cad s/p PCI, HTN,  presenting after 3 episodes of syncope    Plan of Care:    #Syncope  - ACS ruled out  - EKG shows sinus rhythm no acute ischemic changes   - Echo shows grossly normal LV systolic function. No further cardiac testing warranted at this time.   - Orthostatics positive. Pt to increase fluid intake.     #UTI   - Management as per primary team    #CAD  - History of stents  - ASA 81mg daily      #HTN  - Continue Lisinopril    #ACP (advance care planning)-  Advanced care planning was discussed.  Risks, benefits and alternatives of medical treatment and procedures were addressed. 30 additional minutes spent addressing advance care plans.      Over 25 minutes spent on total encounter; more than 50% of the visit was spent counseling and/or coordinating care by the attending physician.      Jesus Alberto Silva,  [facility]  Cardiovascular Disease  [phone]
